# Patient Record
Sex: MALE | Race: WHITE
[De-identification: names, ages, dates, MRNs, and addresses within clinical notes are randomized per-mention and may not be internally consistent; named-entity substitution may affect disease eponyms.]

---

## 2020-03-02 ENCOUNTER — HOSPITAL ENCOUNTER (OUTPATIENT)
Dept: HOSPITAL 75 - RAD | Age: 65
End: 2020-03-02
Attending: CHIROPRACTOR
Payer: COMMERCIAL
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
## 2020-03-02 DIAGNOSIS — M25.562: Primary | ICD-10-CM

## 2020-03-02 DIAGNOSIS — M99.06: ICD-10-CM

## 2020-03-02 DIAGNOSIS — R29.3: ICD-10-CM

## 2020-03-02 PROCEDURE — 73562 X-RAY EXAM OF KNEE 3: CPT

## 2020-03-02 NOTE — DIAGNOSTIC IMAGING REPORT
Left knee at 941 hours.



INDICATION: Fell, knee pain.



3 views were obtained. There are no prior studies available for

comparison.



FINDINGS: There is no fracture, dislocation or acute bony

abnormality evident. There is mild soft tissue edema along the

anterior aspect of the knee joint but there is no evidence for a

fracture of the patella. The soft tissues are otherwise

unremarkable.



IMPRESSION: There is no evidence for an acute bony abnormality.



Dictated by: 



  Dictated on workstation # [id number]

## 2023-01-25 ENCOUNTER — HOSPITAL ENCOUNTER (OUTPATIENT)
Dept: HOSPITAL 75 - PREOP | Age: 68
Discharge: HOME | End: 2023-01-25
Attending: SURGERY
Payer: MEDICARE

## 2023-01-25 VITALS — BODY MASS INDEX: 31.07 KG/M2 | WEIGHT: 205.03 LBS | HEIGHT: 67.99 IN

## 2023-01-25 DIAGNOSIS — Z01.818: Primary | ICD-10-CM
